# Patient Record
Sex: FEMALE | Race: OTHER | HISPANIC OR LATINO | URBAN - METROPOLITAN AREA
[De-identification: names, ages, dates, MRNs, and addresses within clinical notes are randomized per-mention and may not be internally consistent; named-entity substitution may affect disease eponyms.]

---

## 2019-11-27 ENCOUNTER — OUTPATIENT (OUTPATIENT)
Dept: OUTPATIENT SERVICES | Facility: HOSPITAL | Age: 54
LOS: 1 days | End: 2019-11-27
Payer: MEDICAID

## 2019-11-27 ENCOUNTER — RESULT REVIEW (OUTPATIENT)
Age: 54
End: 2019-11-27

## 2019-11-27 DIAGNOSIS — K92.2 GASTROINTESTINAL HEMORRHAGE, UNSPECIFIED: ICD-10-CM

## 2019-11-27 PROCEDURE — 88305 TISSUE EXAM BY PATHOLOGIST: CPT | Mod: 26

## 2019-11-27 PROCEDURE — 88312 SPECIAL STAINS GROUP 1: CPT | Mod: 26

## 2019-11-27 PROCEDURE — 88312 SPECIAL STAINS GROUP 1: CPT

## 2019-11-27 PROCEDURE — 88305 TISSUE EXAM BY PATHOLOGIST: CPT

## 2019-11-27 PROCEDURE — 43239 EGD BIOPSY SINGLE/MULTIPLE: CPT

## 2019-12-02 LAB — SURGICAL PATHOLOGY STUDY: SIGNIFICANT CHANGE UP

## 2021-04-06 ENCOUNTER — TRANSCRIPTION ENCOUNTER (OUTPATIENT)
Age: 56
End: 2021-04-06

## 2021-04-06 ENCOUNTER — EMERGENCY (EMERGENCY)
Facility: HOSPITAL | Age: 56
LOS: 1 days | Discharge: HOME | End: 2021-04-06
Attending: STUDENT IN AN ORGANIZED HEALTH CARE EDUCATION/TRAINING PROGRAM
Payer: MEDICAID

## 2021-04-06 VITALS
HEIGHT: 58.27 IN | TEMPERATURE: 99 F | WEIGHT: 136.69 LBS | OXYGEN SATURATION: 97 % | RESPIRATION RATE: 16 BRPM | DIASTOLIC BLOOD PRESSURE: 74 MMHG | HEART RATE: 82 BPM | SYSTOLIC BLOOD PRESSURE: 131 MMHG

## 2021-04-06 PROCEDURE — 99284 EMERGENCY DEPT VISIT MOD MDM: CPT

## 2021-04-06 PROCEDURE — 99282 EMERGENCY DEPT VISIT SF MDM: CPT

## 2021-04-06 NOTE — ED PROVIDER NOTE - CLINICAL SUMMARY MEDICAL DECISION MAKING FREE TEXT BOX
56F presenting with fever for several days. has covid. vitals stable, tolerating PO. no need for IV hydration.

## 2021-04-06 NOTE — ED PROVIDER NOTE - OBJECTIVE STATEMENT
56F presenting with fever. tested positive for covid today. patient and family requesting testing. 56F presenting with fever. tested positive for covid today. was sent by urgent care for IV hydration.

## 2021-04-06 NOTE — ED PROVIDER NOTE - PATIENT PORTAL LINK FT
You can access the FollowMyHealth Patient Portal offered by [facility] by registering at the following website: [email or web address]. By joining [employer] [facility]’s FollowMyHealth portal, you will also be able to view your health information using other applications (apps) compatible with our system.

## 2021-04-06 NOTE — ED PROVIDER NOTE - NSFOLLOWUPINSTRUCTIONS_ED_ALL_ED_FT
You were seen and evaluated for infection which may be COVID 19. Testing was done. We think you are safe for discharge and to follow up in a few days with your doctor by phone or in person.   You should treat fevers by taking advil or tylenol as needed.    You should stay hydrated and increase the amount of fluid you drink.  Return to the Emergency Department if your shortness of breath becomes worse, you become weak, or new symptoms develop.    You should monitor your temperature and quarantine yourself away from others - particularly the elderly, ill, or immunosuppressed - for the next 14 days, or until you find out that you do not have COVID19.    Should your COVID19 viral swab that was performed today result POSITIVE you will be contacted by phone at the number you provided when registered for this visit.      DO NOT CALL THE EMERGENCY DEPARTMENT FOR YOUR TEST RESULTS, you may [phone].

## 2021-04-06 NOTE — ED PROVIDER NOTE - PHYSICAL EXAMINATION
General: well appearing, no acute distress   HEENT: normocephalic, atraumatic   Respiratory: normal work of breathing  MSK: no swelling or tenderness of lower extremities, moving all extremities spontaneously   Skin: warm, dry   Neuro: A&Ox3  Psych: appropriate affect

## 2022-08-17 NOTE — ED ADULT TRIAGE NOTE - CCCP TRG CHIEF CMPLNT
Preliminary findings given to Óscar Cadet MD. Preliminary for right lower extremity venous is negative for acute DVT   c/o fever on /off x 6 days + covid today/fever

## 2022-11-08 PROBLEM — Z00.00 ENCOUNTER FOR PREVENTIVE HEALTH EXAMINATION: Status: ACTIVE | Noted: 2022-11-08

## 2022-11-17 ENCOUNTER — APPOINTMENT (OUTPATIENT)
Dept: NEUROSURGERY | Facility: CLINIC | Age: 57
End: 2022-11-17

## 2022-11-17 VITALS
BODY MASS INDEX: 29.12 KG/M2 | HEIGHT: 57 IN | SYSTOLIC BLOOD PRESSURE: 123 MMHG | TEMPERATURE: 98.2 F | HEART RATE: 75 BPM | OXYGEN SATURATION: 97 % | DIASTOLIC BLOOD PRESSURE: 69 MMHG | WEIGHT: 135 LBS

## 2022-11-17 DIAGNOSIS — Z86.39 PERSONAL HISTORY OF OTHER ENDOCRINE, NUTRITIONAL AND METABOLIC DISEASE: ICD-10-CM

## 2022-11-17 DIAGNOSIS — B69.0 CYSTICERCOSIS OF CENTRAL NERVOUS SYSTEM: ICD-10-CM

## 2022-11-17 DIAGNOSIS — Z78.9 OTHER SPECIFIED HEALTH STATUS: ICD-10-CM

## 2022-11-17 DIAGNOSIS — Z87.09 PERSONAL HISTORY OF OTHER DISEASES OF THE RESPIRATORY SYSTEM: ICD-10-CM

## 2022-11-17 DIAGNOSIS — Z83.6 FAMILY HISTORY OF OTHER DISEASES OF THE RESPIRATORY SYSTEM: ICD-10-CM

## 2022-11-17 PROCEDURE — 99203 OFFICE O/P NEW LOW 30 MIN: CPT

## 2022-11-17 RX ORDER — AMITRIPTYLINE HYDROCHLORIDE 10 MG/1
10 TABLET, FILM COATED ORAL
Qty: 30 | Refills: 0 | Status: ACTIVE | COMMUNITY
Start: 2022-10-21

## 2022-11-17 RX ORDER — FAMOTIDINE 20 MG/1
20 TABLET, FILM COATED ORAL
Qty: 30 | Refills: 0 | Status: ACTIVE | COMMUNITY
Start: 2022-09-14

## 2022-11-17 RX ORDER — FLUOXETINE HYDROCHLORIDE 20 MG/1
20 CAPSULE ORAL
Qty: 30 | Refills: 0 | Status: ACTIVE | COMMUNITY
Start: 2022-06-21

## 2022-11-17 RX ORDER — ERGOCALCIFEROL 1.25 MG/1
1.25 MG CAPSULE, LIQUID FILLED ORAL
Qty: 4 | Refills: 0 | Status: ACTIVE | COMMUNITY
Start: 2022-06-21

## 2022-11-17 RX ORDER — PANTOPRAZOLE 40 MG/1
40 TABLET, DELAYED RELEASE ORAL
Qty: 30 | Refills: 0 | Status: ACTIVE | COMMUNITY
Start: 2022-06-06

## 2022-11-17 RX ORDER — ALENDRONATE SODIUM 70 MG/1
70 TABLET ORAL
Qty: 4 | Refills: 0 | Status: ACTIVE | COMMUNITY
Start: 2022-11-05

## 2022-11-17 RX ORDER — MONTELUKAST 10 MG/1
10 TABLET, FILM COATED ORAL
Qty: 30 | Refills: 0 | Status: ACTIVE | COMMUNITY
Start: 2022-11-07

## 2022-11-17 RX ORDER — LEVOTHYROXINE SODIUM 0.05 MG/1
50 TABLET ORAL
Qty: 34 | Refills: 0 | Status: ACTIVE | COMMUNITY
Start: 2022-06-21

## 2022-11-17 RX ORDER — ASPIRIN 81 MG/1
81 TABLET, CHEWABLE ORAL
Qty: 30 | Refills: 0 | Status: ACTIVE | COMMUNITY
Start: 2022-10-12

## 2022-11-17 RX ORDER — ATORVASTATIN CALCIUM 40 MG/1
40 TABLET, FILM COATED ORAL
Qty: 30 | Refills: 0 | Status: ACTIVE | COMMUNITY
Start: 2022-11-01

## 2022-11-17 RX ORDER — CEFUROXIME AXETIL 500 MG/1
500 TABLET ORAL
Qty: 14 | Refills: 0 | Status: ACTIVE | COMMUNITY
Start: 2022-07-19

## 2022-11-17 RX ORDER — ALBUTEROL SULFATE 90 UG/1
108 (90 BASE) INHALANT RESPIRATORY (INHALATION)
Qty: 7 | Refills: 0 | Status: ACTIVE | COMMUNITY
Start: 2022-10-12

## 2022-11-17 NOTE — REVIEW OF SYSTEMS
[Negative] : Heme/Lymph [As Noted in HPI] : as noted in HPI [Tension Headache] : tension-type headaches

## 2022-11-29 NOTE — ASSESSMENT
[FreeTextEntry1] : Mrs. España is a 58 y/o, female, with cysticercosis of brain, no neuro deficits on exam. Patient needs to establish care with ID for treatment. Will repeat MRI brain. Pt counseled on red flag symptoms, including seizures.

## 2022-11-29 NOTE — HISTORY OF PRESENT ILLNESS
[FreeTextEntry1] : HA  [de-identified] : 11/07/22: Mrs. España is a 56 y/o, female, with a pmhx of asthma, HLD, hypothyroidism, with recently dx neurocysticercosis, here to establish care as a new pt. She reports she has been experiencing HA which remained unresolved and w/u of head was performed which showed enhancing parenchymal lesions. She denies seizures, weakness, change in vision, diplopia, n/v. She has not been seen by ID, or received ab treatment.

## 2022-11-29 NOTE — RESULTS/DATA
[FreeTextEntry1] : \par IMPRESSION:\par \par Several mildly enhancing parenchymal lesions without associated cerebral edema and decreased GRE signal intensity consistent with calcification demonstrated on CT. Findings suggest neurocysticercosis. However, metastatic disease may be considered. There is no associated edema.